# Patient Record
(demographics unavailable — no encounter records)

---

## 2025-04-21 NOTE — FAMILY HISTORY
[___ inches] : [unfilled] inches [FreeTextEntry5] : 16 [FreeTextEntry2] : 6 siblings healthy - older sis Siomara GHD tx now 63, older brother Pat low IGF1 tx GH now 66"; late rachel

## 2025-04-21 NOTE — PHYSICAL EXAM
[Healthy Appearing] : healthy appearing [Well Nourished] : well nourished [Interactive] : interactive [Normal Appearance] : normal appearance [Well formed] : well formed [Normally Set] : normally set [Normal S1 and S2] : normal S1 and S2 [Clear to Ausculation Bilaterally] : clear to auscultation bilaterally [Abdomen Soft] : soft [Abdomen Tenderness] : non-tender [] : no hepatosplenomegaly [Normal] : grossly intact [3] : was Juventino stage 3 [Testes] : normal [___] : [unfilled] [Carrying Angle] : normal carrying angle [Short Metatarsals] : short metatarsals [WNL for age] : within normal limits of age [Looks Younger than Stated Years] : looks younger than stated years [Goiter] : no goiter [Murmur] : no murmurs [Scoliosis] : scoliosis not appreciated [Short Metacarpals] : no short metacarpals [Valgus/Varus LE Deformity] : no valgus/varus LE deformity [de-identified] : Ht SD -2.72 [de-identified] : nl oropharynx [de-identified] : T2 gynecomastia R [FreeTextEntry1] : no axillary hair [de-identified] : nonfocal [de-identified] : short 5th metatarsal

## 2025-04-21 NOTE — PHYSICAL EXAM
[Healthy Appearing] : healthy appearing [Well Nourished] : well nourished [Interactive] : interactive [Normal Appearance] : normal appearance [Well formed] : well formed [Normally Set] : normally set [Normal S1 and S2] : normal S1 and S2 [Clear to Ausculation Bilaterally] : clear to auscultation bilaterally [Abdomen Soft] : soft [Abdomen Tenderness] : non-tender [] : no hepatosplenomegaly [Normal] : grossly intact [3] : was Juventino stage 3 [Testes] : normal [___] : [unfilled] [Carrying Angle] : normal carrying angle [Short Metatarsals] : short metatarsals [WNL for age] : within normal limits of age [Looks Younger than Stated Years] : looks younger than stated years [Goiter] : no goiter [Murmur] : no murmurs [Scoliosis] : scoliosis not appreciated [Short Metacarpals] : no short metacarpals [Valgus/Varus LE Deformity] : no valgus/varus LE deformity [de-identified] : Ht SD -2.72 [de-identified] : nl oropharynx [de-identified] : T2 gynecomastia R [FreeTextEntry1] : no axillary hair [de-identified] : nonfocal [de-identified] : short 5th metatarsal

## 2025-04-21 NOTE — DATA REVIEWED
[FreeTextEntry1] : Growth chart reviewed: Weight 25th 4-8y, 10th at 9y, *5th at 10y, <3rd at 12y, <<3rd at 14y Height 10th at 4y, 5-10th 5-6y, 5th 8y, slightly <3rd at 9y, <3rd at 12y, <<3rd at 14y (low nl BMI)  Imaging 6/5/23 AB 11y6m @ CA 13y2m

## 2025-04-21 NOTE — CONSULT LETTER
[Dear  ___] : Dear  [unfilled], [Consult Letter:] : I had the pleasure of evaluating your patient, [unfilled]. [( Thank you for referring [unfilled] for consultation for _____ )] : Thank you for referring [unfilled] for consultation for [unfilled] [Please see my note below.] : Please see my note below. [Consult Closing:] : Thank you very much for allowing me to participate in the care of this patient.  If you have any questions, please do not hesitate to contact me. [Sincerely,] : Sincerely, [FreeTextEntry3] : Pavithra Mccormack MD Director, Pediatric Endocrinology [facility],   of Pediatrics  [facility] [employer] [facility] at [facility]

## 2025-04-21 NOTE — CONSULT LETTER
[Dear  ___] : Dear  [unfilled], [Consult Letter:] : I had the pleasure of evaluating your patient, [unfilled]. [( Thank you for referring [unfilled] for consultation for _____ )] : Thank you for referring [unfilled] for consultation for [unfilled] [Please see my note below.] : Please see my note below. [Consult Closing:] : Thank you very much for allowing me to participate in the care of this patient.  If you have any questions, please do not hesitate to contact me. [Sincerely,] : Sincerely, [FreeTextEntry3] : Pavithra Mccormack MD Director, Pediatric Endocrinology [facility], [facility]  of Pediatrics  [facility] at [facility]

## 2025-04-21 NOTE — PAST MEDICAL HISTORY
[At Term] : at term [Normal Vaginal Route] : by normal vaginal route [None] : there were no delivery complications [Age Appropriate] : age appropriate developmental milestones met [de-identified] : nl preg [FreeTextEntry1] : ?nl

## 2025-04-21 NOTE — HISTORY OF PRESENT ILLNESS
[FreeTextEntry2] : Jesus is a 15y1m M referred for evaluation of short stature.  Has been seeing Dr. Preston at [facility] since *13yo, last visit 10/2024 (went there due to his insurance, now insurance changed - returning here (all other kids seen here).  So far saying familial short stature, has not had GH stim.  Is outgrowing clothes yearly, shoe size increases yearly (now 36/5.5).  Not aware of pubertal development.  Started acne last year.  Did per father have a delayed bone age.  Not a great eater - very picky, denies skipping meals.  On stimulant for years.  Generally healthy.  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

## 2025-04-21 NOTE — REVIEW OF SYSTEMS
[Nl] : Neurological [Short Stature] : short stature was noted [Change in Activity] : no change in activity [Joint Pains] : no arthralgias [Change in Vision] : no change in vision  [Diarrhea] : no diarrhea [Abdominal Pain] : no abdominal pain [Constipation] : no constipation [Urinary Frequency] : no urinary frequency [Headache] : no headache [Cold Intolerance] : cold tolerant

## 2025-04-21 NOTE — HISTORY OF PRESENT ILLNESS
[FreeTextEntry2] : Jesus is a 15y1m M referred for evaluation of short stature.  Has been seeing Dr. Preston at [facility] since *11yo, last visit 10/2024 (went there due to his insurance, now insurance changed - returning here (all other kids seen here).  So far saying familial short stature, has not had GH stim.  Is outgrowing clothes yearly, shoe size increases yearly (now 36/5.5).  Not aware of pubertal development.  Started acne last year.  Did per father have a delayed bone age.  Not a great eater - very picky, denies skipping meals.  On stimulant for years.  Generally healthy.  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

## 2025-04-21 NOTE — DISCUSSION/SUMMARY
[FreeTextEntry1] : Jesus is a 16yo M with significant short stature as well as pubertal delay.  There is family history of short stature, GH deficiency, and IGF1 deficiency.  There appears to have been progressive decline in height percentile since 12y, and the weight has followed.  Records from prior endocrine evaluation were not available at time of visit and per report it has been some time since last bloodwork and bone age.  I have thus recommended at this time obtaining a new baseline set of bloodwork and bone age.  Additionally discussed getting PA for SHOX as I strongly suspect a genetic cause, alternatively if all labs are normal we consider a more extensive skeletal dysplasia panel.

## 2025-04-21 NOTE — FAMILY HISTORY
[___ inches] : [unfilled] inches [FreeTextEntry5] : 16 [FreeTextEntry2] : 6 siblings healthy - older sis Siomara GHD tx now 63, older brother Pta low IGF1 tx GH now 66"; late rachel

## 2025-04-21 NOTE — ASSESSMENT
[FreeTextEntry1] : SHOX testing - to attempt PA  Try to obtain records from [facility] - mother reaching out

## 2025-04-21 NOTE — DISCUSSION/SUMMARY
[FreeTextEntry1] : Jesus is a 14yo M with significant short stature as well as pubertal delay.  There is family history of short stature, GH deficiency, and IGF1 deficiency.  There appears to have been progressive decline in height percentile since 12y, and the weight has followed.  Records from prior endocrine evaluation were not available at time of visit and per report it has been some time since last bloodwork and bone age.  I have thus recommended at this time obtaining a new baseline set of bloodwork and bone age.  Additionally discussed getting PA for SHOX as I strongly suspect a genetic cause, alternatively if all labs are normal we consider a more extensive skeletal dysplasia panel.